# Patient Record
Sex: FEMALE | Race: WHITE | Employment: OTHER | URBAN - METROPOLITAN AREA
[De-identification: names, ages, dates, MRNs, and addresses within clinical notes are randomized per-mention and may not be internally consistent; named-entity substitution may affect disease eponyms.]

---

## 2021-12-22 ENCOUNTER — HOSPITAL ENCOUNTER (EMERGENCY)
Age: 86
Discharge: HOME | End: 2021-12-23
Attending: EMERGENCY MEDICINE
Payer: MEDICARE

## 2021-12-22 DIAGNOSIS — I48.0 PAROXYSMAL ATRIAL FIBRILLATION (HCC): Primary | ICD-10-CM

## 2021-12-22 LAB
ALBUMIN SERPL-MCNC: 3 G/DL (ref 3.5–5)
ALBUMIN/GLOB SERPL: 0.8 {RATIO} (ref 1.1–2.2)
ALP SERPL-CCNC: 92 U/L (ref 45–117)
ALT SERPL-CCNC: 14 U/L (ref 12–78)
ANION GAP SERPL CALC-SCNC: 5 MMOL/L (ref 5–15)
AST SERPL-CCNC: 9 U/L (ref 15–37)
BASOPHILS # BLD: 0 K/UL (ref 0–0.1)
BASOPHILS NFR BLD: 0 % (ref 0–1)
BILIRUB SERPL-MCNC: 0.3 MG/DL (ref 0.2–1)
BUN SERPL-MCNC: 15 MG/DL (ref 6–20)
BUN/CREAT SERPL: 17 (ref 12–20)
CALCIUM SERPL-MCNC: 9.1 MG/DL (ref 8.5–10.1)
CHLORIDE SERPL-SCNC: 105 MMOL/L (ref 97–108)
CO2 SERPL-SCNC: 27 MMOL/L (ref 21–32)
COMMENT, HOLDF: NORMAL
CREAT SERPL-MCNC: 0.88 MG/DL (ref 0.55–1.02)
DIFFERENTIAL METHOD BLD: ABNORMAL
EOSINOPHIL # BLD: 0.2 K/UL (ref 0–0.4)
EOSINOPHIL NFR BLD: 3 % (ref 0–7)
ERYTHROCYTE [DISTWIDTH] IN BLOOD BY AUTOMATED COUNT: 13.4 % (ref 11.5–14.5)
GLOBULIN SER CALC-MCNC: 3.7 G/DL (ref 2–4)
GLUCOSE SERPL-MCNC: 147 MG/DL (ref 65–100)
HCT VFR BLD AUTO: 42.8 % (ref 35–47)
HGB BLD-MCNC: 14 G/DL (ref 11.5–16)
IMM GRANULOCYTES # BLD AUTO: 0 K/UL (ref 0–0.04)
IMM GRANULOCYTES NFR BLD AUTO: 0 % (ref 0–0.5)
LYMPHOCYTES # BLD: 1 K/UL (ref 0.8–3.5)
LYMPHOCYTES NFR BLD: 12 % (ref 12–49)
MCH RBC QN AUTO: 33.5 PG (ref 26–34)
MCHC RBC AUTO-ENTMCNC: 32.7 G/DL (ref 30–36.5)
MCV RBC AUTO: 102.4 FL (ref 80–99)
MONOCYTES # BLD: 0.8 K/UL (ref 0–1)
MONOCYTES NFR BLD: 10 % (ref 5–13)
NEUTS SEG # BLD: 6.1 K/UL (ref 1.8–8)
NEUTS SEG NFR BLD: 75 % (ref 32–75)
NRBC # BLD: 0 K/UL (ref 0–0.01)
NRBC BLD-RTO: 0 PER 100 WBC
PLATELET # BLD AUTO: 233 K/UL (ref 150–400)
PMV BLD AUTO: 11.3 FL (ref 8.9–12.9)
POTASSIUM SERPL-SCNC: 4.1 MMOL/L (ref 3.5–5.1)
PROT SERPL-MCNC: 6.7 G/DL (ref 6.4–8.2)
RBC # BLD AUTO: 4.18 M/UL (ref 3.8–5.2)
SAMPLES BEING HELD,HOLD: NORMAL
SODIUM SERPL-SCNC: 137 MMOL/L (ref 136–145)
WBC # BLD AUTO: 8.2 K/UL (ref 3.6–11)

## 2021-12-22 PROCEDURE — 80053 COMPREHEN METABOLIC PANEL: CPT

## 2021-12-22 PROCEDURE — 96376 TX/PRO/DX INJ SAME DRUG ADON: CPT

## 2021-12-22 PROCEDURE — 99285 EMERGENCY DEPT VISIT HI MDM: CPT

## 2021-12-22 PROCEDURE — 96365 THER/PROPH/DIAG IV INF INIT: CPT

## 2021-12-22 PROCEDURE — 36415 COLL VENOUS BLD VENIPUNCTURE: CPT

## 2021-12-22 PROCEDURE — 85025 COMPLETE CBC W/AUTO DIFF WBC: CPT

## 2021-12-22 PROCEDURE — 84484 ASSAY OF TROPONIN QUANT: CPT

## 2021-12-22 PROCEDURE — 74011000250 HC RX REV CODE- 250: Performed by: EMERGENCY MEDICINE

## 2021-12-22 PROCEDURE — 93005 ELECTROCARDIOGRAM TRACING: CPT

## 2021-12-22 RX ORDER — DILTIAZEM HYDROCHLORIDE 5 MG/ML
10 INJECTION INTRAVENOUS
Status: COMPLETED | OUTPATIENT
Start: 2021-12-22 | End: 2021-12-22

## 2021-12-22 RX ADMIN — DILTIAZEM HYDROCHLORIDE 10 MG: 5 INJECTION INTRAVENOUS at 23:54

## 2021-12-23 ENCOUNTER — APPOINTMENT (OUTPATIENT)
Dept: GENERAL RADIOLOGY | Age: 86
End: 2021-12-23
Attending: EMERGENCY MEDICINE
Payer: MEDICARE

## 2021-12-23 VITALS
RESPIRATION RATE: 16 BRPM | TEMPERATURE: 97.6 F | DIASTOLIC BLOOD PRESSURE: 70 MMHG | SYSTOLIC BLOOD PRESSURE: 119 MMHG | BODY MASS INDEX: 39.86 KG/M2 | OXYGEN SATURATION: 96 % | HEART RATE: 72 BPM | HEIGHT: 63 IN

## 2021-12-23 LAB
ATRIAL RATE: 74 BPM
CALCULATED P AXIS, ECG09: 76 DEGREES
CALCULATED R AXIS, ECG10: -40 DEGREES
CALCULATED T AXIS, ECG11: 53 DEGREES
DIAGNOSIS, 93000: NORMAL
P-R INTERVAL, ECG05: 204 MS
Q-T INTERVAL, ECG07: 406 MS
QRS DURATION, ECG06: 100 MS
QTC CALCULATION (BEZET), ECG08: 450 MS
TROPONIN-HIGH SENSITIVITY: 10 NG/L (ref 0–51)
TROPONIN-HIGH SENSITIVITY: 9 NG/L (ref 0–51)
VENTRICULAR RATE, ECG03: 74 BPM

## 2021-12-23 PROCEDURE — 74011000250 HC RX REV CODE- 250: Performed by: EMERGENCY MEDICINE

## 2021-12-23 PROCEDURE — 71045 X-RAY EXAM CHEST 1 VIEW: CPT

## 2021-12-23 PROCEDURE — 74011250637 HC RX REV CODE- 250/637: Performed by: EMERGENCY MEDICINE

## 2021-12-23 PROCEDURE — 74011000258 HC RX REV CODE- 258: Performed by: EMERGENCY MEDICINE

## 2021-12-23 PROCEDURE — 93005 ELECTROCARDIOGRAM TRACING: CPT

## 2021-12-23 RX ORDER — DILTIAZEM HYDROCHLORIDE 240 MG/1
240 CAPSULE, EXTENDED RELEASE ORAL DAILY
COMMUNITY

## 2021-12-23 RX ORDER — CARVEDILOL 25 MG/1
25 TABLET ORAL 2 TIMES DAILY WITH MEALS
COMMUNITY

## 2021-12-23 RX ORDER — ALLOPURINOL 100 MG/1
TABLET ORAL DAILY
COMMUNITY

## 2021-12-23 RX ORDER — TELMISARTAN 80 MG/1
80 TABLET ORAL DAILY
COMMUNITY

## 2021-12-23 RX ORDER — PROPAFENONE HYDROCHLORIDE 150 MG/1
150 TABLET, FILM COATED ORAL 2 TIMES DAILY
COMMUNITY

## 2021-12-23 RX ADMIN — ALUMINUM HYDROXIDE AND MAGNESIUM HYDROXIDE 30 ML: 200; 200 SUSPENSION ORAL at 02:34

## 2021-12-23 RX ADMIN — DILTIAZEM HYDROCHLORIDE 10 MG/HR: 5 INJECTION INTRAVENOUS at 00:16

## 2021-12-23 NOTE — DISCHARGE INSTRUCTIONS
You came in with an irregular heart rhythm called atrial fibrillation. You have converted back into a normal rhythm. I have spoken with Dr. Regine Rowley, the cardiologist on call for Dr. Vince Cisse. He would like us to start you on a blood thinner. He does not want to adjust any of your medications at this time, but will have Dr. Vince Cisse contact you. If you have any return of symptoms, chest pain, difficulty breathing, feeling lightheaded/dizzy, etc. Return to the ED.

## 2021-12-23 NOTE — ED TRIAGE NOTES
Pt arrives ambulatory to triage with c/o irregular heart beat and tachycardia. Pt states she has been treated for tachycardia for the past few years . She called her doctor today and they told her to take an extra half dose of carvedilol but it did not help.

## 2021-12-23 NOTE — ED PROVIDER NOTES
HPI     59-year-old female with a history of arthritis, chronic pain, acid reflux, hypertension, anxiety, presents emergency department complaining of generally not feeling well and a fast heart rate. Patient states it started yesterday but she did not realize how irregular and fast her heart was going until today. She denies chest pain. She does have some shortness of breath but states it is unchanged from baseline. She called her cardiologist who told her to take an extra 1/2 dose of carvedilol which did not help. She took that at 3 PM.  She denies a history of atrial fibrillation. She reports baseline swelling in her legs. She denies exertional fatigue chest pain or shortness of breath. She denies a fever or cough. She has been vaccinated for Covid. Denies any Covid exposures. She is not on any blood thinners.     Past Medical History:   Diagnosis Date    Arthritis     Cancer [facility]) 2004    BREAST    Chronic pain     Diabetes ([address]     PRE-DIET CONTROLLED    GERD (gastroesophageal reflux disease)     Hypertension     Nausea & vomiting     Psychiatric disorder     ANXIETY    Unspecified adverse effect of anesthesia     STOPPED BREATHING BRIEFLY    Unspecified adverse effect of anesthesia     AWOKE EARLY X2    Unspecified sleep apnea     HX-NO CPAP PRESENTLY       Past Surgical History:   Procedure Laterality Date    ABDOMEN SURGERY PROC UNLISTED  1958    HERNIA - Umbilical    BREAST SURGERY PROCEDURE UNLISTED  2004    R LUMPECTOMY    HX DILATION AND CURETTAGE      HX HEENT  2004/2005    STUART CATARACTS    HX HYSTERECTOMY  1994    HX ORTHOPAEDIC  2005    STUART CARPAL TUNNEL    HX OTHER SURGICAL      ENDOSCOPY/COLONOSCOPY    HX TONSILLECTOMY  1940         Family History:   Problem Relation Age of Onset    Post-op Nausea/Vomiting Daughter        Social History     Socioeconomic History    Marital status:      Spouse name: Not on file    Number of children: Not on file    Years of education: Not on file    Highest education level: Not on file   Occupational History    Not on file   Tobacco Use    Smoking status: Never Smoker    Smokeless tobacco: Not on file   Substance and Sexual Activity    Alcohol use: No    Drug use: Not on file    Sexual activity: Not on file   Other Topics Concern    Not on file   Social History Narrative    Not on file     Social Determinants of Health     Financial Resource Strain:     Difficulty of Paying Living Expenses: Not on file   Food Insecurity:     Worried About Running Out of Food in the Last Year: Not on file    Marilyn of Food in the Last Year: Not on file   Transportation Needs:     Lack of Transportation (Medical): Not on file    Lack of Transportation (Non-Medical): Not on file   Physical Activity:     Days of Exercise per Week: Not on file    Minutes of Exercise per Session: Not on file   Stress:     Feeling of Stress : Not on file   Social Connections:     Frequency of Communication with Friends and Family: Not on file    Frequency of Social Gatherings with Friends and Family: Not on file    Attends [employer] Services: Not on file    Active Member of [address] [place] [address] or Organizations: Not on file    Attends Club or Organization Meetings: Not on file    Marital Status: Not on file   Intimate Partner Violence:     Fear of Current or Ex-Partner: Not on file    Emotionally Abused: Not on file    Physically Abused: Not on file    Sexually Abused: Not on file   Housing Stability:     Unable to Pay for Housing in the Last Year: Not on file    Number of [place] in the Last Year: Not on file    Unstable Housing in the Last Year: Not on file         ALLERGIES: Codeine    Review of Systems   Constitutional: Negative for fever. HENT: Negative for congestion. Eyes: Negative for visual disturbance. Respiratory: Positive for shortness of breath. Cardiovascular: Positive for palpitations. Negative for chest pain.    Gastrointestinal: Negative for abdominal pain, nausea and vomiting. Endocrine: Negative for polyuria. Genitourinary: Negative for dysuria. Musculoskeletal: Negative for gait problem. Skin: Negative for rash. Neurological: Negative for dizziness, light-headedness and headaches. Psychiatric/Behavioral: Negative for dysphoric mood. Vitals:    12/22/21 2315 12/22/21 2323 12/22/21 2345 12/22/21 2346   BP: (!) 147/77  (!) 151/82    Pulse: 99  91 (!) 107   Resp: 15  15 17   Temp:       SpO2: 97% 97%     Height:                Physical Exam  Constitutional:       General: She is not in acute distress. Appearance: She is well-developed. HENT:      Head: Normocephalic and atraumatic. Mouth/Throat:      Pharynx: No oropharyngeal exudate. Eyes:      General: No scleral icterus. Right eye: No discharge. Left eye: No discharge. Pupils: Pupils are equal, round, and reactive to light. Neck:      Vascular: No JVD. Cardiovascular:      Rate and Rhythm: Tachycardia present. Rhythm irregular. Heart sounds: Normal heart sounds. No murmur heard. Pulmonary:      Effort: Pulmonary effort is normal. No respiratory distress. Breath sounds: Normal breath sounds. No stridor. No wheezing or rales. Chest:      Chest wall: No tenderness. Abdominal:      General: Bowel sounds are normal. There is no distension. Palpations: Abdomen is soft. There is no mass. Tenderness: There is no abdominal tenderness. There is no guarding or rebound. Musculoskeletal:         General: Normal range of motion. Cervical back: Normal range of motion and neck supple. Skin:     General: Skin is warm and dry. Capillary Refill: Capillary refill takes less than 2 seconds. Findings: No rash. Neurological:      Mental Status: She is oriented to person, place, and time. Psychiatric:         Behavior: Behavior normal.         Thought Content:  Thought content normal.         Judgment: Judgment normal.          MDM     40 minutes of critical care time. Procedures      ED EKG interpretation:  Rhythm: atrial fib; and irregular. Rate (approx.): 89; Axis: left axis deviation; P wave: ; QRS interval: normal ; ST/T wave: non-specific changes; This EKG was interpreted by Cecil Saldivar MD,ED Provider. Heart rate while in room in the 130's. Will give cardizem bolus, gtt and reassess. Patient has converted back to sinus rhythm. We will touch base with his cardiologist to help adjust medications. Anticipate discharge. I spoke with Dr. Poli Shahid, cardiology. He would like us to start on Eliquis but not change any other medications at this time. They will call her tomorrow. I reviewed results with the patient and the discharge plan. Return precautions provided. ED EKG interpretation:  Rhythm: normal sinus rhythm; and regular . Rate (approx.): 74; Axis: normal; P wave: normal; QRS interval: normal ; ST/T wave: non-specific changes;. This EKG was interpreted by Cecil Saldivar MD,ED Provider.

## 2021-12-24 LAB
CALCULATED R AXIS, ECG10: -51 DEGREES
CALCULATED T AXIS, ECG11: 32 DEGREES
DIAGNOSIS, 93000: NORMAL
Q-T INTERVAL, ECG07: 358 MS
QRS DURATION, ECG06: 92 MS
QTC CALCULATION (BEZET), ECG08: 435 MS
VENTRICULAR RATE, ECG03: 89 BPM

## 2022-12-16 ENCOUNTER — APPOINTMENT (OUTPATIENT)
Dept: GENERAL RADIOLOGY | Age: 87
End: 2022-12-16
Attending: EMERGENCY MEDICINE
Payer: MEDICARE

## 2022-12-16 ENCOUNTER — HOSPITAL ENCOUNTER (EMERGENCY)
Age: 87
Discharge: HOME | End: 2022-12-16
Attending: EMERGENCY MEDICINE
Payer: MEDICARE

## 2022-12-16 VITALS
OXYGEN SATURATION: 92 % | RESPIRATION RATE: 15 BRPM | DIASTOLIC BLOOD PRESSURE: 78 MMHG | HEART RATE: 68 BPM | SYSTOLIC BLOOD PRESSURE: 161 MMHG

## 2022-12-16 DIAGNOSIS — I49.3 FREQUENT PVCS: ICD-10-CM

## 2022-12-16 DIAGNOSIS — Z86.79 HISTORY OF ATRIAL FIBRILLATION: Primary | ICD-10-CM

## 2022-12-16 DIAGNOSIS — R00.2 PALPITATIONS: ICD-10-CM

## 2022-12-16 LAB
ALBUMIN SERPL-MCNC: 3.3 G/DL (ref 3.5–5)
ALBUMIN/GLOB SERPL: 0.9 {RATIO} (ref 1.1–2.2)
ALP SERPL-CCNC: 87 U/L (ref 45–117)
ALT SERPL-CCNC: 17 U/L (ref 12–78)
ANION GAP SERPL CALC-SCNC: 4 MMOL/L (ref 5–15)
AST SERPL-CCNC: 12 U/L (ref 15–37)
ATRIAL RATE: 74 BPM
BASOPHILS # BLD: 0.1 K/UL (ref 0–0.1)
BASOPHILS NFR BLD: 1 % (ref 0–1)
BILIRUB SERPL-MCNC: 0.3 MG/DL (ref 0.2–1)
BUN SERPL-MCNC: 20 MG/DL (ref 6–20)
BUN/CREAT SERPL: 24 (ref 12–20)
CALCIUM SERPL-MCNC: 9 MG/DL (ref 8.5–10.1)
CALCULATED P AXIS, ECG09: 69 DEGREES
CALCULATED R AXIS, ECG10: -43 DEGREES
CALCULATED T AXIS, ECG11: 58 DEGREES
CHLORIDE SERPL-SCNC: 106 MMOL/L (ref 97–108)
CO2 SERPL-SCNC: 28 MMOL/L (ref 21–32)
COMMENT, HOLDF: NORMAL
CREAT SERPL-MCNC: 0.83 MG/DL (ref 0.55–1.02)
DIAGNOSIS, 93000: NORMAL
DIFFERENTIAL METHOD BLD: ABNORMAL
EOSINOPHIL # BLD: 0.3 K/UL (ref 0–0.4)
EOSINOPHIL NFR BLD: 5 % (ref 0–7)
ERYTHROCYTE [DISTWIDTH] IN BLOOD BY AUTOMATED COUNT: 12.8 % (ref 11.5–14.5)
GLOBULIN SER CALC-MCNC: 3.7 G/DL (ref 2–4)
GLUCOSE SERPL-MCNC: 115 MG/DL (ref 65–100)
HCT VFR BLD AUTO: 42.3 % (ref 35–47)
HGB BLD-MCNC: 13.9 G/DL (ref 11.5–16)
IMM GRANULOCYTES # BLD AUTO: 0 K/UL (ref 0–0.04)
IMM GRANULOCYTES NFR BLD AUTO: 0 % (ref 0–0.5)
LYMPHOCYTES # BLD: 1.1 K/UL (ref 0.8–3.5)
LYMPHOCYTES NFR BLD: 17 % (ref 12–49)
MAGNESIUM SERPL-MCNC: 2.9 MG/DL (ref 1.6–2.4)
MCH RBC QN AUTO: 34.1 PG (ref 26–34)
MCHC RBC AUTO-ENTMCNC: 32.9 G/DL (ref 30–36.5)
MCV RBC AUTO: 103.7 FL (ref 80–99)
MONOCYTES # BLD: 0.7 K/UL (ref 0–1)
MONOCYTES NFR BLD: 10 % (ref 5–13)
NEUTS SEG # BLD: 4.4 K/UL (ref 1.8–8)
NEUTS SEG NFR BLD: 67 % (ref 32–75)
NRBC # BLD: 0 K/UL (ref 0–0.01)
NRBC BLD-RTO: 0 PER 100 WBC
P-R INTERVAL, ECG05: 206 MS
PLATELET # BLD AUTO: 229 K/UL (ref 150–400)
PMV BLD AUTO: 11.4 FL (ref 8.9–12.9)
POTASSIUM SERPL-SCNC: 4.2 MMOL/L (ref 3.5–5.1)
PROT SERPL-MCNC: 7 G/DL (ref 6.4–8.2)
Q-T INTERVAL, ECG07: 418 MS
QRS DURATION, ECG06: 100 MS
QTC CALCULATION (BEZET), ECG08: 463 MS
RBC # BLD AUTO: 4.08 M/UL (ref 3.8–5.2)
SAMPLES BEING HELD,HOLD: NORMAL
SODIUM SERPL-SCNC: 138 MMOL/L (ref 136–145)
TROPONIN-HIGH SENSITIVITY: 8 NG/L (ref 0–51)
VENTRICULAR RATE, ECG03: 74 BPM
WBC # BLD AUTO: 6.6 K/UL (ref 3.6–11)

## 2022-12-16 PROCEDURE — 99285 EMERGENCY DEPT VISIT HI MDM: CPT

## 2022-12-16 PROCEDURE — 71045 X-RAY EXAM CHEST 1 VIEW: CPT

## 2022-12-16 PROCEDURE — 84484 ASSAY OF TROPONIN QUANT: CPT

## 2022-12-16 PROCEDURE — 83735 ASSAY OF MAGNESIUM: CPT

## 2022-12-16 PROCEDURE — 93005 ELECTROCARDIOGRAM TRACING: CPT

## 2022-12-16 PROCEDURE — 80053 COMPREHEN METABOLIC PANEL: CPT

## 2022-12-16 PROCEDURE — 74011250636 HC RX REV CODE- 250/636: Performed by: EMERGENCY MEDICINE

## 2022-12-16 PROCEDURE — 85025 COMPLETE CBC W/AUTO DIFF WBC: CPT

## 2022-12-16 RX ORDER — SODIUM CHLORIDE 9 MG/ML
150 INJECTION, SOLUTION INTRAVENOUS ONCE
Status: COMPLETED | OUTPATIENT
Start: 2022-12-16 | End: 2022-12-16

## 2022-12-16 RX ORDER — ACETAMINOPHEN 325 MG/1
650 TABLET ORAL
Status: DISCONTINUED | OUTPATIENT
Start: 2022-12-16 | End: 2022-12-16 | Stop reason: HOSPADM

## 2022-12-16 RX ADMIN — SODIUM CHLORIDE 150 ML/HR: 900 INJECTION, SOLUTION INTRAVENOUS at 06:08

## 2022-12-16 NOTE — DISCHARGE INSTRUCTIONS
Need to continue your medications as directed and follow-up with Dr. Russ Cook this morning. Call the office. The on-call physician for her's thought that she may want to put a Holter monitor on you today.   The noted that she was seen in the ED and that I spoke with Dr. Dickson Andrade he had recommended calling the office for Dr. Russ Cook

## 2022-12-16 NOTE — ED PROVIDER NOTES
40-year-old woman presents for palpitations that she has had all evening. History of A. fib and SVT on Cardizem and Eliquis. She is in a sinus rhythm here. Denies syncope, denies chest pain. Called her cardiologist who gave her the mesfin advice to come directly to the ER.    ROS:  Constitutional: Negative for chills or fever. Eyes: Negative for vision change or loss. ENT and mouth: Negative for ear drainage, epistaxis, or mouth sores. Cardiovascular: Negative for chest pain. Positive for palpitations. Respiratory: No wheezing or shortness of breath. Gastrointestinal: No melena or BRB per rectum. Musculoskeletal: No loss of range of motion. Neurologic: No unilateral weakness. Integumentary: No rash. Psychiatric: Negative for SI.    10 level review of systems is otherwise negative except as noted above in the ROS and in the history of present illness. Reviewed and agree with available nursing notes. Available prior ED visit history reviewed. Vital signs were reviewed and oxygenation is adequate. Physical exam:  Constitutional: Awake, alert, not in severe distress. Head and neck: Normocephalic, atraumatic. No JVD, no nuchal rigidity. ENT and mouth: No active epistaxis, external ears normal, trachea is midline. Eyes: Extraocular movements intact, no periorbital edema. Cardiovascular: Regular rate, regular rhythm. Respiratory: No increased work of breathing, no signs of pending respiratory failure. Gastrointestinal: Nondistended. Musculoskeletal: Free range of motion, no edema. Integumentary: Warm and dry, no rash, skin is intact. Neurologic: Normal speech, no lateralizing neurologic deficit. Psychiatric: Appropriate affect, not responding to internal stimuli.     Differential diagnosis includes but is not limited to: STEMI, non-STEMI, pneumonia, pneumothorax, hemothorax, pulmonary contusion, atypical chest pain, noncardiac chest pain, costochondritis, chest wall pain, pleurisy, arrhythmia, hyperkalemia, COVID-19, bronchitis. Medical decision making and ED course: Stable patient with a sinus rhythm on the monitor, will discharge home recommend she follow-up with her cardiologist.  Pt is already on Coreg 25mg BID and Cardizem 240mg daily. Her resting HR is in the 50's/60's so I\"m a bit apprehensive to up her meds for PVC's that are not sustained or causing syncope/near syncope.        Palpitations        Past Medical History:   Diagnosis Date    Arthritis     [address] ([facility] [address] 2004    BREAST    Chronic pain     Diabetes ([facility] [address]     PRE-DIET CONTROLLED    GERD (gastroesophageal reflux disease)     Hypertension     Nausea & vomiting     Psychiatric disorder     ANXIETY    Unspecified adverse effect of anesthesia     STOPPED BREATHING BRIEFLY    Unspecified adverse effect of anesthesia     AWOKE EARLY X2    Unspecified sleep apnea     HX-NO CPAP PRESENTLY       Past Surgical History:   Procedure Laterality Date    ABDOMEN SURGERY PROC UNLISTED  1958    HERNIA - Umbilical    BREAST SURGERY PROCEDURE UNLISTED  2004    R LUMPECTOMY    HX DILATION AND CURETTAGE      HX HEENT  2004/2005    STUART CATARACTS    HX HYSTERECTOMY  1994    HX ORTHOPAEDIC  2005    STUART CARPAL TUNNEL    HX OTHER SURGICAL      ENDOSCOPY/COLONOSCOPY    HX TONSILLECTOMY  1940         Family History:   Problem Relation Age of Onset    Post-op Nausea/Vomiting Daughter        Social History     Socioeconomic History    Marital status:      Spouse name: Not on file    Number of children: Not on file    Years of education: Not on file    Highest education level: Not on file   Occupational History    Not on file   Tobacco Use    Smoking status: Never    Smokeless tobacco: Not on file   Substance and Sexual Activity    Alcohol use: No    Drug use: Not on file    Sexual activity: Not on file   Other Topics Concern    Not on file   Social History Narrative    Not on file     Social Determinants of Health Financial Resource Strain: Not on file   Food Insecurity: Not on file   Transportation Needs: Not on file   Physical Activity: Not on file   Stress: Not on file   Social Connections: Not on file   Intimate Partner Violence: Not on file   Housing Stability: Not on file         ALLERGIES: Codeine    Review of Systems   Cardiovascular:  Positive for palpitations. There were no vitals filed for this visit. Physical Exam     MDM  Number of Diagnoses or Management Options  Diagnosis management comments: EKG interpreted by myself ED physician: Normal sinus rhythm, heart rate 74, no diagnostic ST changes, occasional PVC. ED Course as of 12/18/22 1136   Fri Dec 16, 2022   1955 Patient is turned over to me by Dr. Tal Palomares he pending consult with cardiology. Patient currently is in a normal sinus rhythm with a rate at 70 beats a minute. Sugar is 167/87. Patient in no acute distress.  [RP]      ED Course User Index  [RP] Jessica Heller MD       Procedures

## 2022-12-16 NOTE — ED NOTES
Bedside and Verbal shift change report given to Gamal Pisano (oncoming nurse) by Jesse Pennington (offgoing nurse). Report included the following information SBAR, ED Summary and MAR.

## 2022-12-16 NOTE — CONSENT
I have spoken with Dr. Juve Falcon and he would like the patient to be discharged to follow-up with Dr. Mannie Berrios in the office. Patient is aware and in agreement. All labs and findings were discussed with her. I was attempting to discharge her and she has not even registered at this point. I that she be registered.

## 2022-12-16 NOTE — ED TRIAGE NOTES
Patient arrives via EMS with c/o heart palpitations that started this morning. On EMS arrival patient was noted to have some PVS. Patient has a hx of A-fib and SVT. Patient called cardiologist to inform of symptoms and doctor recommended patient come here. Patient states she took an extra dose of her Cardizem today.

## 2022-12-16 NOTE — ED NOTES
Spoken with cardiology on-call for Dr. Keshav Choi. The patient was noted to be in normal sinus rhythm and hemodynamically stable. Her only complaint presently is some discomfort in her back from laying on the stretcher. I have asked the nurse to reposition the patient and provide some Tylenol. I discussed her current medications, and the cardiologist did not want to change any of those medications. He felt that she should call the office today and they may want to put her on a monitor to follow her rhythms at home. Had no chest pain and her cardiac enzymes appear to be normal.  She will be discharged home to continue her current medications as directed and follow-up with Dr. Carleen Maldonado office today.